# Patient Record
Sex: FEMALE | Race: WHITE | NOT HISPANIC OR LATINO | URBAN - METROPOLITAN AREA
[De-identification: names, ages, dates, MRNs, and addresses within clinical notes are randomized per-mention and may not be internally consistent; named-entity substitution may affect disease eponyms.]

---

## 2017-02-24 ENCOUNTER — IMPORTED ENCOUNTER (OUTPATIENT)
Dept: URBAN - METROPOLITAN AREA CLINIC 9 | Facility: CLINIC | Age: 72
End: 2017-02-24

## 2017-05-15 ENCOUNTER — IMPORTED ENCOUNTER (OUTPATIENT)
Dept: URBAN - METROPOLITAN AREA CLINIC 9 | Facility: CLINIC | Age: 72
End: 2017-05-15

## 2017-09-27 ENCOUNTER — IMPORTED ENCOUNTER (OUTPATIENT)
Dept: URBAN - METROPOLITAN AREA CLINIC 9 | Facility: CLINIC | Age: 72
End: 2017-09-27

## 2017-11-03 ENCOUNTER — IMPORTED ENCOUNTER (OUTPATIENT)
Dept: URBAN - METROPOLITAN AREA CLINIC 9 | Facility: CLINIC | Age: 72
End: 2017-11-03

## 2017-12-01 ENCOUNTER — IMPORTED ENCOUNTER (OUTPATIENT)
Dept: URBAN - METROPOLITAN AREA CLINIC 9 | Facility: CLINIC | Age: 72
End: 2017-12-01

## 2018-01-17 ENCOUNTER — IMPORTED ENCOUNTER (OUTPATIENT)
Dept: URBAN - METROPOLITAN AREA CLINIC 9 | Facility: CLINIC | Age: 73
End: 2018-01-17

## 2018-04-13 ENCOUNTER — IMPORTED ENCOUNTER (OUTPATIENT)
Dept: URBAN - METROPOLITAN AREA CLINIC 9 | Facility: CLINIC | Age: 73
End: 2018-04-13

## 2018-06-15 ENCOUNTER — IMPORTED ENCOUNTER (OUTPATIENT)
Dept: URBAN - METROPOLITAN AREA CLINIC 9 | Facility: CLINIC | Age: 73
End: 2018-06-15

## 2018-08-23 ENCOUNTER — IMPORTED ENCOUNTER (OUTPATIENT)
Dept: URBAN - METROPOLITAN AREA CLINIC 9 | Facility: CLINIC | Age: 73
End: 2018-08-23

## 2018-11-28 ENCOUNTER — IMPORTED ENCOUNTER (OUTPATIENT)
Dept: URBAN - METROPOLITAN AREA CLINIC 9 | Facility: CLINIC | Age: 73
End: 2018-11-28

## 2019-01-04 ENCOUNTER — IMPORTED ENCOUNTER (OUTPATIENT)
Dept: URBAN - METROPOLITAN AREA CLINIC 9 | Facility: CLINIC | Age: 74
End: 2019-01-04

## 2019-02-08 ENCOUNTER — IMPORTED ENCOUNTER (OUTPATIENT)
Dept: URBAN - METROPOLITAN AREA CLINIC 9 | Facility: CLINIC | Age: 74
End: 2019-02-08

## 2019-03-08 ENCOUNTER — IMPORTED ENCOUNTER (OUTPATIENT)
Dept: URBAN - METROPOLITAN AREA CLINIC 9 | Facility: CLINIC | Age: 74
End: 2019-03-08

## 2019-04-10 ENCOUNTER — IMPORTED ENCOUNTER (OUTPATIENT)
Dept: URBAN - METROPOLITAN AREA CLINIC 9 | Facility: CLINIC | Age: 74
End: 2019-04-10

## 2019-05-24 ENCOUNTER — IMPORTED ENCOUNTER (OUTPATIENT)
Dept: URBAN - METROPOLITAN AREA CLINIC 9 | Facility: CLINIC | Age: 74
End: 2019-05-24

## 2019-08-15 ENCOUNTER — IMPORTED ENCOUNTER (OUTPATIENT)
Dept: URBAN - METROPOLITAN AREA CLINIC 9 | Facility: CLINIC | Age: 74
End: 2019-08-15

## 2019-09-27 ENCOUNTER — IMPORTED ENCOUNTER (OUTPATIENT)
Dept: URBAN - METROPOLITAN AREA CLINIC 9 | Facility: CLINIC | Age: 74
End: 2019-09-27

## 2019-11-27 ENCOUNTER — IMPORTED ENCOUNTER (OUTPATIENT)
Dept: URBAN - METROPOLITAN AREA CLINIC 9 | Facility: CLINIC | Age: 74
End: 2019-11-27

## 2020-01-24 ENCOUNTER — IMPORTED ENCOUNTER (OUTPATIENT)
Dept: URBAN - METROPOLITAN AREA CLINIC 9 | Facility: CLINIC | Age: 75
End: 2020-01-24

## 2020-03-13 ENCOUNTER — IMPORTED ENCOUNTER (OUTPATIENT)
Dept: URBAN - METROPOLITAN AREA CLINIC 9 | Facility: CLINIC | Age: 75
End: 2020-03-13

## 2020-05-08 ENCOUNTER — IMPORTED ENCOUNTER (OUTPATIENT)
Dept: URBAN - METROPOLITAN AREA CLINIC 9 | Facility: CLINIC | Age: 75
End: 2020-05-08

## 2020-07-24 ENCOUNTER — IMPORTED ENCOUNTER (OUTPATIENT)
Dept: URBAN - METROPOLITAN AREA CLINIC 9 | Facility: CLINIC | Age: 75
End: 2020-07-24

## 2020-08-20 ENCOUNTER — IMPORTED ENCOUNTER (OUTPATIENT)
Dept: URBAN - METROPOLITAN AREA CLINIC 9 | Facility: CLINIC | Age: 75
End: 2020-08-20

## 2020-10-16 ENCOUNTER — IMPORTED ENCOUNTER (OUTPATIENT)
Dept: URBAN - METROPOLITAN AREA CLINIC 9 | Facility: CLINIC | Age: 75
End: 2020-10-16

## 2021-01-08 ENCOUNTER — IMPORTED ENCOUNTER (OUTPATIENT)
Dept: URBAN - METROPOLITAN AREA CLINIC 9 | Facility: CLINIC | Age: 76
End: 2021-01-08

## 2021-03-19 ENCOUNTER — IMPORTED ENCOUNTER (OUTPATIENT)
Dept: URBAN - METROPOLITAN AREA CLINIC 9 | Facility: CLINIC | Age: 76
End: 2021-03-19

## 2021-04-01 NOTE — PATIENT DISCUSSION
CATARACTS, OU - NOT VISUALLY SIGNIFICANT, VISION IMPROVES WITH NEW GLASSES. GLASSES RX GIVEN. CONSIDER SURGERY IF GLASSES DO NOT PROVIDE SATISFACTORY VISION OR IMPROVE HER GLARE.

## 2021-05-28 ENCOUNTER — IMPORTED ENCOUNTER (OUTPATIENT)
Dept: URBAN - METROPOLITAN AREA CLINIC 9 | Facility: CLINIC | Age: 76
End: 2021-05-28

## 2021-08-27 ENCOUNTER — IMPORTED ENCOUNTER (OUTPATIENT)
Dept: URBAN - METROPOLITAN AREA CLINIC 9 | Facility: CLINIC | Age: 76
End: 2021-08-27

## 2021-10-16 ASSESSMENT — TONOMETRY
OD_IOP_MMHG: 15
OD_IOP_MMHG: 13
OD_IOP_MMHG: 17
OS_IOP_MMHG: 16
OD_IOP_MMHG: 16
OD_IOP_MMHG: 16
OS_IOP_MMHG: 12
OS_IOP_MMHG: 14
OD_IOP_MMHG: 14
OS_IOP_MMHG: 17
OS_IOP_MMHG: 13
OD_IOP_MMHG: 14
OS_IOP_MMHG: 14
OS_IOP_MMHG: 14
OD_IOP_MMHG: 16
OD_IOP_MMHG: 19
OD_IOP_MMHG: 17
OS_IOP_MMHG: 16
OS_IOP_MMHG: 16
OS_IOP_MMHG: 19
OS_IOP_MMHG: 15
OD_IOP_MMHG: 18
OS_IOP_MMHG: 15
OS_IOP_MMHG: 10
OS_IOP_MMHG: 11
OS_IOP_MMHG: 11
OD_IOP_MMHG: 13
OD_IOP_MMHG: 17
OS_IOP_MMHG: 15
OD_IOP_MMHG: 16
OS_IOP_MMHG: 16
OS_IOP_MMHG: 13
OD_IOP_MMHG: 19
OD_IOP_MMHG: 16
OD_IOP_MMHG: 12
OS_IOP_MMHG: 16
OS_IOP_MMHG: 17
OD_IOP_MMHG: 10
OS_IOP_MMHG: 18
OS_IOP_MMHG: 20
OD_IOP_MMHG: 18
OS_IOP_MMHG: 17
OS_IOP_MMHG: 15
OD_IOP_MMHG: 15
OS_IOP_MMHG: 17
OD_IOP_MMHG: 17
OS_IOP_MMHG: 18
OD_IOP_MMHG: 12
OD_IOP_MMHG: 16
OS_IOP_MMHG: 17
OD_IOP_MMHG: 16
OD_IOP_MMHG: 12
OD_IOP_MMHG: 16
OD_IOP_MMHG: 16
OS_IOP_MMHG: 16
OD_IOP_MMHG: 18

## 2021-10-16 ASSESSMENT — KERATOMETRY
OS_AXISANGLE_DEGREES: 92
OS_AXISANGLE2_DEGREES: 99
OS_AXISANGLE_DEGREES: 9
OS_AXISANGLE_DEGREES: 11
OD_K1POWER_DIOPTERS: 39.5
OD_AXISANGLE2_DEGREES: 77
OD_AXISANGLE_DEGREES: 75
OS_K2POWER_DIOPTERS: 41.5
OD_K2POWER_DIOPTERS: 41.5
OS_AXISANGLE2_DEGREES: 101
OS_K2POWER_DIOPTERS: 40.5
OS_K1POWER_DIOPTERS: 40
OD_K2POWER_DIOPTERS: 41.75
OD_AXISANGLE_DEGREES: 167
OS_K1POWER_DIOPTERS: 40
OD_AXISANGLE2_DEGREES: 76
OS_K2POWER_DIOPTERS: 44.5
OD_AXISANGLE_DEGREES: 166
OD_AXISANGLE2_DEGREES: 165
OD_K1POWER_DIOPTERS: 39.5
OS_AXISANGLE2_DEGREES: 2
OD_K1POWER_DIOPTERS: 39.5
OS_K1POWER_DIOPTERS: 41.5
OD_K2POWER_DIOPTERS: 41.5

## 2021-10-16 ASSESSMENT — VISUAL ACUITY
OD_PH: 20/80 -2 SN
OS_CC: CF 1FT SN
OD_SC: 20/80 -2 SN
OS_CC: CF 1FT SN
OD_CC: 20/60 -2 SN
OD_CC: 20/80 - SN
OS_CC: CF 1FT SN
OS_CC: CF 1FT SN
OD_CC: 20/70 SN
OD_CC: 20/60 - SN
OS_CC: CF 2FT SN
OD_CC: 20/100 -2 SN
OD_CC: 20/60 - SN
OD_CC: 20/60 + SN
OD_CC: 20/50 -2 SN
OD_CC: 20/80 SN
OS_CC: 20/40 -2 SN
OD_CC: 20/100 -2 SN
OD_CC: 20/70 + SN
OD_CC: 20/60 - SN
OD_CC: 20/80 - SN
OS_CC: CF 1FT SN
OD_CC: 20/60 + SN
OD_CC: 20/80 - SN
OS_CC: CF 1FT SN
OS_CC: CF 2FT SN
OD_CC: 20/100 SN
OD_CC: 20/80 SN
OS_SC: 20/40 -2 SN
OS_CC: 20/40 - SN
OD_CC: 20/60 + SN
OD_CC: 20/60 SN
OD_CC: 20/60 -2 SN
OD_CC: 20/60 SN
OD_CC: 20/60 SN
OS_CC: CF 1FT SN
OS_CC: CF 1FT SN
OD_CC: 20/100 SN
OD_CC: 20/60 - SN
OS_CC: CF 1FT SN
OS_CC: CF 2FT SN
OD_CC: 20/100 + SN
OS_CC: CF 1FT SN
OD_CC: 20/80 - SN
OD_CC: 20/80 SN
OS_CC: CF 2FT SN
OS_CC: CF 1FT SN
OD_CC: 20/80 - SN
OS_PH: 20/40 -2 SN
OS_CC: CF 1FT SN
OS_CC: 20/200 - SN
OD_PH: 20/70 SN
OD_CC: 20/100 SN
OD_CC: 20/80 -2 SN
OD_CC: 20/100 -2 SN
OD_CC: 20/80 -2 SN
OS_CC: CF 1FT SN
OD_CC: 20/80 SN
OS_CC: CF 1FT SN
OS_CC: CF 2FT SN

## 2021-11-16 ENCOUNTER — PREPPED CHART (OUTPATIENT)
Dept: URBAN - METROPOLITAN AREA CLINIC 17 | Facility: CLINIC | Age: 76
End: 2021-11-16

## 2021-11-19 ENCOUNTER — ESTABLISHED PATIENT (OUTPATIENT)
Dept: URBAN - METROPOLITAN AREA CLINIC 17 | Facility: CLINIC | Age: 76
End: 2021-11-19

## 2021-11-19 DIAGNOSIS — H43.813: ICD-10-CM

## 2021-11-19 DIAGNOSIS — H35.3223: ICD-10-CM

## 2021-11-19 DIAGNOSIS — H35.3211: ICD-10-CM

## 2021-11-19 PROCEDURE — 92134 CPTRZ OPH DX IMG PST SGM RTA: CPT

## 2021-11-19 PROCEDURE — 92014 COMPRE OPH EXAM EST PT 1/>: CPT

## 2021-11-19 PROCEDURE — 67028 INJECTION EYE DRUG: CPT

## 2021-11-19 ASSESSMENT — TONOMETRY
OD_IOP_MMHG: 17
OS_IOP_MMHG: 17

## 2021-11-19 ASSESSMENT — VISUAL ACUITY
OD_PH: 20/200
OS_PH: CF 2FT
OU_CC: 20/200
OD_CC: 20/200
OS_CC: CF 2FT

## 2022-02-11 ENCOUNTER — ESTABLISHED PATIENT (OUTPATIENT)
Dept: URBAN - METROPOLITAN AREA CLINIC 17 | Facility: CLINIC | Age: 77
End: 2022-02-11

## 2022-02-11 DIAGNOSIS — H35.3223: ICD-10-CM

## 2022-02-11 DIAGNOSIS — H53.031: ICD-10-CM

## 2022-02-11 DIAGNOSIS — H43.813: ICD-10-CM

## 2022-02-11 DIAGNOSIS — H35.3211: ICD-10-CM

## 2022-02-11 PROCEDURE — 92014 COMPRE OPH EXAM EST PT 1/>: CPT

## 2022-02-11 PROCEDURE — 92134 CPTRZ OPH DX IMG PST SGM RTA: CPT

## 2022-02-11 PROCEDURE — 67028 INJECTION EYE DRUG: CPT

## 2022-02-11 ASSESSMENT — VISUAL ACUITY
OS_SC: CF 3FT
OD_SC: 20/200

## 2022-02-11 ASSESSMENT — TONOMETRY
OD_IOP_MMHG: 15
OS_IOP_MMHG: 16

## 2022-05-06 ENCOUNTER — ESTABLISHED PATIENT (OUTPATIENT)
Dept: URBAN - METROPOLITAN AREA CLINIC 17 | Facility: CLINIC | Age: 77
End: 2022-05-06

## 2022-05-06 DIAGNOSIS — H35.3211: ICD-10-CM

## 2022-05-06 DIAGNOSIS — H35.3223: ICD-10-CM

## 2022-05-06 DIAGNOSIS — H43.813: ICD-10-CM

## 2022-05-06 DIAGNOSIS — H53.031: ICD-10-CM

## 2022-05-06 PROCEDURE — 67028 INJECTION EYE DRUG: CPT

## 2022-05-06 PROCEDURE — 92014 COMPRE OPH EXAM EST PT 1/>: CPT

## 2022-05-06 PROCEDURE — 92134 CPTRZ OPH DX IMG PST SGM RTA: CPT

## 2022-05-06 ASSESSMENT — TONOMETRY
OD_IOP_MMHG: 17
OS_IOP_MMHG: 17

## 2022-05-06 ASSESSMENT — VISUAL ACUITY
OS_CC: CF 1FT
OD_CC: 20/200

## 2022-07-29 ENCOUNTER — ESTABLISHED PATIENT (OUTPATIENT)
Dept: URBAN - METROPOLITAN AREA CLINIC 17 | Facility: CLINIC | Age: 77
End: 2022-07-29

## 2022-07-29 DIAGNOSIS — H53.031: ICD-10-CM

## 2022-07-29 DIAGNOSIS — H35.3211: ICD-10-CM

## 2022-07-29 DIAGNOSIS — H43.813: ICD-10-CM

## 2022-07-29 DIAGNOSIS — H35.3223: ICD-10-CM

## 2022-07-29 PROCEDURE — 92014 COMPRE OPH EXAM EST PT 1/>: CPT

## 2022-07-29 PROCEDURE — 67028 INJECTION EYE DRUG: CPT

## 2022-07-29 PROCEDURE — 92134 CPTRZ OPH DX IMG PST SGM RTA: CPT

## 2022-07-29 ASSESSMENT — VISUAL ACUITY
OD_SC: 20/200
OS_SC: CF 1FT

## 2022-07-29 ASSESSMENT — TONOMETRY
OS_IOP_MMHG: 13
OD_IOP_MMHG: 23

## 2022-08-08 NOTE — PATIENT DISCUSSION
Pt would like to be less dependent on glasses for distance. Plan for custom with a goal of plano in both eyes.

## 2022-08-08 NOTE — PATIENT DISCUSSION
Pt is followed by [facility] in [place]. Pt agrees to follow up for a current visual field test in consideration of KDB at the time of cataract surgery.

## 2022-10-28 ENCOUNTER — ESTABLISHED PATIENT (OUTPATIENT)
Dept: URBAN - METROPOLITAN AREA CLINIC 17 | Facility: CLINIC | Age: 77
End: 2022-10-28

## 2022-10-28 DIAGNOSIS — H53.031: ICD-10-CM

## 2022-10-28 DIAGNOSIS — H43.813: ICD-10-CM

## 2022-10-28 DIAGNOSIS — H35.3231: ICD-10-CM

## 2022-10-28 PROCEDURE — 92134 CPTRZ OPH DX IMG PST SGM RTA: CPT

## 2022-10-28 PROCEDURE — 6702850 BILATERAL INTRAVITREAL INJECTION

## 2022-10-28 PROCEDURE — 92014 COMPRE OPH EXAM EST PT 1/>: CPT

## 2022-10-28 ASSESSMENT — VISUAL ACUITY
OD_SC: 20/300
OS_SC: CF 1FT

## 2022-10-28 ASSESSMENT — TONOMETRY
OD_IOP_MMHG: 17
OS_IOP_MMHG: 17

## 2023-01-20 ENCOUNTER — ESTABLISHED PATIENT (OUTPATIENT)
Dept: URBAN - METROPOLITAN AREA CLINIC 17 | Facility: CLINIC | Age: 78
End: 2023-01-20

## 2023-01-20 DIAGNOSIS — H43.813: ICD-10-CM

## 2023-01-20 DIAGNOSIS — H35.3231: ICD-10-CM

## 2023-01-20 DIAGNOSIS — H53.031: ICD-10-CM

## 2023-01-20 PROCEDURE — 92134 CPTRZ OPH DX IMG PST SGM RTA: CPT

## 2023-01-20 PROCEDURE — 92014 COMPRE OPH EXAM EST PT 1/>: CPT

## 2023-01-20 PROCEDURE — 67028 INJECTION EYE DRUG: CPT

## 2023-01-20 ASSESSMENT — TONOMETRY
OS_IOP_MMHG: 15
OD_IOP_MMHG: 18

## 2023-01-20 ASSESSMENT — VISUAL ACUITY: OD_SC: 20/400

## 2023-04-14 ENCOUNTER — ESTABLISHED PATIENT (OUTPATIENT)
Dept: URBAN - METROPOLITAN AREA CLINIC 17 | Facility: CLINIC | Age: 78
End: 2023-04-14

## 2023-04-14 DIAGNOSIS — H53.031: ICD-10-CM

## 2023-04-14 DIAGNOSIS — H43.813: ICD-10-CM

## 2023-04-14 DIAGNOSIS — H35.3231: ICD-10-CM

## 2023-04-14 PROCEDURE — 92014 COMPRE OPH EXAM EST PT 1/>: CPT

## 2023-04-14 PROCEDURE — 92134 CPTRZ OPH DX IMG PST SGM RTA: CPT

## 2023-04-14 PROCEDURE — 67028 INJECTION EYE DRUG: CPT

## 2023-04-14 ASSESSMENT — TONOMETRY
OD_IOP_MMHG: 18
OS_IOP_MMHG: 15

## 2023-04-14 ASSESSMENT — VISUAL ACUITY: OD_CC: 20/200

## 2023-07-07 ENCOUNTER — ESTABLISHED PATIENT (OUTPATIENT)
Dept: URBAN - METROPOLITAN AREA CLINIC 17 | Facility: CLINIC | Age: 78
End: 2023-07-07

## 2023-07-07 DIAGNOSIS — H53.031: ICD-10-CM

## 2023-07-07 DIAGNOSIS — H35.3231: ICD-10-CM

## 2023-07-07 DIAGNOSIS — H43.813: ICD-10-CM

## 2023-07-07 PROCEDURE — 67028 INJECTION EYE DRUG: CPT

## 2023-07-07 PROCEDURE — 92134 CPTRZ OPH DX IMG PST SGM RTA: CPT

## 2023-07-07 PROCEDURE — 92014 COMPRE OPH EXAM EST PT 1/>: CPT

## 2023-07-07 ASSESSMENT — TONOMETRY
OD_IOP_MMHG: 14
OS_IOP_MMHG: 15

## 2023-07-07 ASSESSMENT — VISUAL ACUITY
OS_CC: CF 2FT
OD_CC: 20/100-1

## 2023-10-20 ENCOUNTER — ESTABLISHED PATIENT (OUTPATIENT)
Dept: URBAN - METROPOLITAN AREA CLINIC 17 | Facility: CLINIC | Age: 78
End: 2023-10-20

## 2023-10-20 DIAGNOSIS — H43.813: ICD-10-CM

## 2023-10-20 DIAGNOSIS — H35.3231: ICD-10-CM

## 2023-10-20 DIAGNOSIS — H53.031: ICD-10-CM

## 2023-10-20 PROCEDURE — 92134 CPTRZ OPH DX IMG PST SGM RTA: CPT

## 2023-10-20 PROCEDURE — 99214 OFFICE O/P EST MOD 30 MIN: CPT | Mod: 25

## 2023-10-20 PROCEDURE — 67028 INJECTION EYE DRUG: CPT | Mod: 50

## 2023-10-20 ASSESSMENT — TONOMETRY
OS_IOP_MMHG: 19
OD_IOP_MMHG: 18

## 2023-10-20 ASSESSMENT — VISUAL ACUITY: OD_CC: 20/200-1

## 2024-01-19 ENCOUNTER — ESTABLISHED PATIENT (OUTPATIENT)
Dept: URBAN - METROPOLITAN AREA CLINIC 17 | Facility: CLINIC | Age: 79
End: 2024-01-19

## 2024-01-19 DIAGNOSIS — H43.813: ICD-10-CM

## 2024-01-19 DIAGNOSIS — H35.3231: ICD-10-CM

## 2024-01-19 DIAGNOSIS — H53.031: ICD-10-CM

## 2024-01-19 PROCEDURE — 67028 INJECTION EYE DRUG: CPT

## 2024-01-19 PROCEDURE — 99214 OFFICE O/P EST MOD 30 MIN: CPT

## 2024-01-19 PROCEDURE — 92134 CPTRZ OPH DX IMG PST SGM RTA: CPT

## 2024-01-19 ASSESSMENT — TONOMETRY
OS_IOP_MMHG: 18
OD_IOP_MMHG: 19

## 2024-01-19 ASSESSMENT — VISUAL ACUITY
OD_CC: 20/100+1
OD_PH: 20/80+1

## 2024-04-12 ENCOUNTER — ESTABLISHED PATIENT (OUTPATIENT)
Dept: URBAN - METROPOLITAN AREA CLINIC 17 | Facility: CLINIC | Age: 79
End: 2024-04-12

## 2024-04-12 DIAGNOSIS — H35.3231: ICD-10-CM

## 2024-04-12 DIAGNOSIS — H43.813: ICD-10-CM

## 2024-04-12 DIAGNOSIS — H53.031: ICD-10-CM

## 2024-04-12 PROCEDURE — 99214 OFFICE O/P EST MOD 30 MIN: CPT | Mod: 25

## 2024-04-12 PROCEDURE — 67028 INJECTION EYE DRUG: CPT | Mod: 50

## 2024-04-12 PROCEDURE — 92134 CPTRZ OPH DX IMG PST SGM RTA: CPT

## 2024-04-12 ASSESSMENT — TONOMETRY
OD_IOP_MMHG: 12
OS_IOP_MMHG: 12

## 2024-04-12 ASSESSMENT — VISUAL ACUITY
OD_CC: 20/80-2
OS_CC: CF 1FT

## 2025-04-18 ENCOUNTER — COMPREHENSIVE EXAM (OUTPATIENT)
Age: 80
End: 2025-04-18

## 2025-04-18 DIAGNOSIS — H35.3212: ICD-10-CM

## 2025-04-18 DIAGNOSIS — H43.813: ICD-10-CM

## 2025-04-18 DIAGNOSIS — H35.3114: ICD-10-CM

## 2025-04-18 DIAGNOSIS — H35.3221: ICD-10-CM

## 2025-04-18 PROCEDURE — 99214 OFFICE O/P EST MOD 30 MIN: CPT

## 2025-04-18 PROCEDURE — 92134 CPTRZ OPH DX IMG PST SGM RTA: CPT

## 2025-04-18 PROCEDURE — 92201 OPSCPY EXTND RTA DRAW UNI/BI: CPT
